# Patient Record
Sex: MALE | Race: WHITE | NOT HISPANIC OR LATINO | URBAN - METROPOLITAN AREA
[De-identification: names, ages, dates, MRNs, and addresses within clinical notes are randomized per-mention and may not be internally consistent; named-entity substitution may affect disease eponyms.]

---

## 2017-01-17 ENCOUNTER — COMMUNICATION - HEALTHEAST (OUTPATIENT)
Dept: FAMILY MEDICINE | Facility: CLINIC | Age: 67
End: 2017-01-17

## 2017-01-18 ENCOUNTER — COMMUNICATION - HEALTHEAST (OUTPATIENT)
Dept: FAMILY MEDICINE | Facility: CLINIC | Age: 67
End: 2017-01-18

## 2017-03-08 ENCOUNTER — COMMUNICATION - HEALTHEAST (OUTPATIENT)
Dept: FAMILY MEDICINE | Facility: CLINIC | Age: 67
End: 2017-03-08

## 2017-04-17 ENCOUNTER — OFFICE VISIT - HEALTHEAST (OUTPATIENT)
Dept: FAMILY MEDICINE | Facility: CLINIC | Age: 67
End: 2017-04-17

## 2017-04-17 DIAGNOSIS — A04.8 H. PYLORI INFECTION: ICD-10-CM

## 2017-04-17 DIAGNOSIS — I10 HYPERTENSION: ICD-10-CM

## 2017-04-17 DIAGNOSIS — R19.7 DIARRHEA: ICD-10-CM

## 2017-04-17 ASSESSMENT — MIFFLIN-ST. JEOR: SCORE: 2245.9

## 2017-08-14 ENCOUNTER — OFFICE VISIT - HEALTHEAST (OUTPATIENT)
Dept: FAMILY MEDICINE | Facility: CLINIC | Age: 67
End: 2017-08-14

## 2017-08-14 DIAGNOSIS — Z12.5 PROSTATE CANCER SCREENING: ICD-10-CM

## 2017-08-14 DIAGNOSIS — Z12.11 SCREEN FOR COLON CANCER: ICD-10-CM

## 2017-08-14 DIAGNOSIS — Z01.818 PREOPERATIVE EXAMINATION: ICD-10-CM

## 2017-08-14 DIAGNOSIS — I10 HYPERTENSION: ICD-10-CM

## 2017-08-14 DIAGNOSIS — H26.9 CATARACTS, BILATERAL: ICD-10-CM

## 2017-08-14 LAB
CHOLEST SERPL-MCNC: 256 MG/DL
FASTING STATUS PATIENT QL REPORTED: YES
HDLC SERPL-MCNC: 39 MG/DL
LDLC SERPL CALC-MCNC: 179 MG/DL
PSA SERPL-MCNC: 0.7 NG/ML (ref 0–4.5)
TRIGL SERPL-MCNC: 190 MG/DL

## 2017-08-14 ASSESSMENT — MIFFLIN-ST. JEOR: SCORE: 2236.83

## 2017-10-02 ENCOUNTER — AMBULATORY - HEALTHEAST (OUTPATIENT)
Dept: NURSING | Facility: CLINIC | Age: 67
End: 2017-10-02

## 2017-10-02 DIAGNOSIS — Z23 IMMUNIZATION DUE: ICD-10-CM

## 2017-10-11 ENCOUNTER — COMMUNICATION - HEALTHEAST (OUTPATIENT)
Dept: FAMILY MEDICINE | Facility: CLINIC | Age: 67
End: 2017-10-11

## 2017-10-11 DIAGNOSIS — I10 ESSENTIAL HYPERTENSION: ICD-10-CM

## 2017-11-22 ENCOUNTER — OFFICE VISIT - HEALTHEAST (OUTPATIENT)
Dept: FAMILY MEDICINE | Facility: CLINIC | Age: 67
End: 2017-11-22

## 2017-11-22 DIAGNOSIS — L03.011 PARONYCHIA OF FINGER OF RIGHT HAND: ICD-10-CM

## 2017-11-22 ASSESSMENT — MIFFLIN-ST. JEOR: SCORE: 2276.29

## 2018-07-13 ENCOUNTER — COMMUNICATION - HEALTHEAST (OUTPATIENT)
Dept: FAMILY MEDICINE | Facility: CLINIC | Age: 68
End: 2018-07-13

## 2018-07-13 DIAGNOSIS — I10 ESSENTIAL HYPERTENSION: ICD-10-CM

## 2018-10-12 ENCOUNTER — COMMUNICATION - HEALTHEAST (OUTPATIENT)
Dept: FAMILY MEDICINE | Facility: CLINIC | Age: 68
End: 2018-10-12

## 2018-10-12 DIAGNOSIS — I10 HTN (HYPERTENSION): ICD-10-CM

## 2019-01-04 ENCOUNTER — OFFICE VISIT - HEALTHEAST (OUTPATIENT)
Dept: FAMILY MEDICINE | Facility: CLINIC | Age: 69
End: 2019-01-04

## 2019-01-04 ENCOUNTER — COMMUNICATION - HEALTHEAST (OUTPATIENT)
Dept: FAMILY MEDICINE | Facility: CLINIC | Age: 69
End: 2019-01-04

## 2019-01-04 ENCOUNTER — HOSPITAL ENCOUNTER (OUTPATIENT)
Dept: LAB | Age: 69
Setting detail: SPECIMEN
Discharge: HOME | End: 2019-01-04

## 2019-01-04 DIAGNOSIS — R73.09 OTHER ABNORMAL GLUCOSE: ICD-10-CM

## 2019-01-04 DIAGNOSIS — I10 HTN (HYPERTENSION): ICD-10-CM

## 2019-01-04 DIAGNOSIS — Z12.5 ENCOUNTER FOR SCREENING FOR MALIGNANT NEOPLASM OF PROSTATE: ICD-10-CM

## 2019-01-04 DIAGNOSIS — E66.01 MORBID OBESITY (H): ICD-10-CM

## 2019-01-04 DIAGNOSIS — E78.49 OTHER HYPERLIPIDEMIA: ICD-10-CM

## 2019-01-04 DIAGNOSIS — Z00.00 MEDICARE ANNUAL WELLNESS VISIT, SUBSEQUENT: ICD-10-CM

## 2019-01-04 DIAGNOSIS — G47.33 OBSTRUCTIVE SLEEP APNEA (ADULT) (PEDIATRIC): ICD-10-CM

## 2019-01-04 DIAGNOSIS — R35.0 URINARY FREQUENCY: ICD-10-CM

## 2019-01-04 LAB
ALBUMIN SERPL-MCNC: 4.2 G/DL (ref 3.5–5)
ALBUMIN UR-MCNC: NEGATIVE MG/DL
ALP SERPL-CCNC: 63 U/L (ref 45–120)
ALT SERPL W P-5'-P-CCNC: 30 U/L (ref 0–45)
ANION GAP SERPL CALCULATED.3IONS-SCNC: 9 MMOL/L (ref 5–18)
APPEARANCE UR: CLEAR
AST SERPL W P-5'-P-CCNC: 21 U/L (ref 0–40)
BILIRUB DIRECT SERPL-MCNC: 0.2 MG/DL
BILIRUB SERPL-MCNC: 0.7 MG/DL (ref 0–1)
BILIRUB UR QL STRIP: NEGATIVE
BUN SERPL-MCNC: 18 MG/DL (ref 8–22)
CALCIUM SERPL-MCNC: 9.7 MG/DL (ref 8.5–10.5)
CHLORIDE BLD-SCNC: 102 MMOL/L (ref 98–107)
CHOLEST SERPL-MCNC: 280 MG/DL
CO2 SERPL-SCNC: 26 MMOL/L (ref 22–31)
COLOR UR AUTO: YELLOW
CREAT SERPL-MCNC: 0.96 MG/DL (ref 0.7–1.3)
ERYTHROCYTE [DISTWIDTH] IN BLOOD BY AUTOMATED COUNT: 11.8 % (ref 11–14.5)
FASTING STATUS PATIENT QL REPORTED: YES
GFR SERPL CREATININE-BSD FRML MDRD: >60 ML/MIN/1.73M2
GLUCOSE BLD-MCNC: 107 MG/DL (ref 70–125)
GLUCOSE UR STRIP-MCNC: NEGATIVE MG/DL
HCT VFR BLD AUTO: 45.1 % (ref 40–54)
HDLC SERPL-MCNC: 45 MG/DL
HGB BLD-MCNC: 15.5 G/DL (ref 14–18)
HGB UR QL STRIP: NEGATIVE
KETONES UR STRIP-MCNC: NEGATIVE MG/DL
LDLC SERPL CALC-MCNC: 200 MG/DL
LEUKOCYTE ESTERASE UR QL STRIP: NEGATIVE
MCH RBC QN AUTO: 31.3 PG (ref 27–34)
MCHC RBC AUTO-ENTMCNC: 34.3 G/DL (ref 32–36)
MCV RBC AUTO: 91 FL (ref 80–100)
NITRATE UR QL: NEGATIVE
PH UR STRIP: 5.5 [PH] (ref 5–8)
PLATELET # BLD AUTO: 184 THOU/UL (ref 140–440)
PMV BLD AUTO: 8.2 FL (ref 7–10)
POTASSIUM BLD-SCNC: 4.5 MMOL/L (ref 3.5–5)
PROT SERPL-MCNC: 7.4 G/DL (ref 6–8)
PSA SERPL-MCNC: 0.9 NG/ML (ref 0–4.5)
RBC # BLD AUTO: 4.93 MILL/UL (ref 4.4–6.2)
SODIUM SERPL-SCNC: 137 MMOL/L (ref 136–145)
SP GR UR STRIP: 1.02 (ref 1–1.03)
TRIGL SERPL-MCNC: 173 MG/DL
UROBILINOGEN UR STRIP-ACNC: NORMAL
WBC: 6.3 THOU/UL (ref 4–11)

## 2019-01-04 ASSESSMENT — MIFFLIN-ST. JEOR: SCORE: 2263.36

## 2019-01-23 ENCOUNTER — COMMUNICATION - HEALTHEAST (OUTPATIENT)
Dept: FAMILY MEDICINE | Facility: CLINIC | Age: 69
End: 2019-01-23

## 2019-01-29 ENCOUNTER — RECORDS - HEALTHEAST (OUTPATIENT)
Dept: ADMINISTRATIVE | Facility: OTHER | Age: 69
End: 2019-01-29

## 2019-01-29 LAB — COLOGUARD-ABSTRACT: NEGATIVE

## 2019-02-14 ENCOUNTER — COMMUNICATION - HEALTHEAST (OUTPATIENT)
Dept: FAMILY MEDICINE | Facility: CLINIC | Age: 69
End: 2019-02-14

## 2019-02-15 ENCOUNTER — COMMUNICATION - HEALTHEAST (OUTPATIENT)
Dept: FAMILY MEDICINE | Facility: CLINIC | Age: 69
End: 2019-02-15

## 2019-04-04 ENCOUNTER — COMMUNICATION - HEALTHEAST (OUTPATIENT)
Dept: FAMILY MEDICINE | Facility: CLINIC | Age: 69
End: 2019-04-04

## 2019-04-04 DIAGNOSIS — E78.49 OTHER HYPERLIPIDEMIA: ICD-10-CM

## 2019-06-19 ENCOUNTER — RECORDS - HEALTHEAST (OUTPATIENT)
Dept: HEALTH INFORMATION MANAGEMENT | Facility: CLINIC | Age: 69
End: 2019-06-19

## 2019-07-01 ENCOUNTER — HOSPITAL ENCOUNTER (OUTPATIENT)
Dept: LAB | Age: 69
Setting detail: SPECIMEN
Discharge: HOME | End: 2019-07-01

## 2019-07-01 ENCOUNTER — AMBULATORY - HEALTHEAST (OUTPATIENT)
Dept: LAB | Facility: CLINIC | Age: 69
End: 2019-07-01

## 2019-07-01 DIAGNOSIS — E78.49 OTHER HYPERLIPIDEMIA: ICD-10-CM

## 2019-07-01 LAB
ALBUMIN SERPL-MCNC: 3.9 G/DL (ref 3.5–5)
ALP SERPL-CCNC: 69 U/L (ref 45–120)
ALT SERPL W P-5'-P-CCNC: 39 U/L (ref 0–45)
AST SERPL W P-5'-P-CCNC: 17 U/L (ref 0–40)
BILIRUB DIRECT SERPL-MCNC: 0.2 MG/DL
BILIRUB SERPL-MCNC: 0.6 MG/DL (ref 0–1)
CHOLEST SERPL-MCNC: 161 MG/DL
FASTING STATUS PATIENT QL REPORTED: YES
FASTING STATUS PATIENT QL REPORTED: YES
GLUCOSE BLD-MCNC: 112 MG/DL (ref 70–99)
HDLC SERPL-MCNC: 44 MG/DL
LDLC SERPL CALC-MCNC: 91 MG/DL
PROT SERPL-MCNC: 7 G/DL (ref 6–8)
TRIGL SERPL-MCNC: 129 MG/DL

## 2019-07-02 ENCOUNTER — COMMUNICATION - HEALTHEAST (OUTPATIENT)
Dept: FAMILY MEDICINE | Facility: CLINIC | Age: 69
End: 2019-07-02

## 2019-07-02 DIAGNOSIS — E78.49 OTHER HYPERLIPIDEMIA: ICD-10-CM

## 2019-10-10 ENCOUNTER — AMBULATORY - HEALTHEAST (OUTPATIENT)
Dept: NURSING | Facility: CLINIC | Age: 69
End: 2019-10-10

## 2019-10-10 DIAGNOSIS — Z23 FLU VACCINE NEED: ICD-10-CM

## 2019-12-21 ENCOUNTER — COMMUNICATION - HEALTHEAST (OUTPATIENT)
Dept: FAMILY MEDICINE | Facility: CLINIC | Age: 69
End: 2019-12-21

## 2019-12-21 DIAGNOSIS — I10 HTN (HYPERTENSION): ICD-10-CM

## 2019-12-28 ENCOUNTER — COMMUNICATION - HEALTHEAST (OUTPATIENT)
Dept: FAMILY MEDICINE | Facility: CLINIC | Age: 69
End: 2019-12-28

## 2019-12-28 DIAGNOSIS — E78.49 OTHER HYPERLIPIDEMIA: ICD-10-CM

## 2020-03-21 ENCOUNTER — COMMUNICATION - HEALTHEAST (OUTPATIENT)
Dept: FAMILY MEDICINE | Facility: CLINIC | Age: 70
End: 2020-03-21

## 2020-03-21 DIAGNOSIS — I10 HTN (HYPERTENSION): ICD-10-CM

## 2020-03-21 DIAGNOSIS — E78.49 OTHER HYPERLIPIDEMIA: ICD-10-CM

## 2020-03-23 ENCOUNTER — COMMUNICATION - HEALTHEAST (OUTPATIENT)
Dept: FAMILY MEDICINE | Facility: CLINIC | Age: 70
End: 2020-03-23

## 2020-03-23 DIAGNOSIS — I10 HTN (HYPERTENSION): ICD-10-CM

## 2020-04-09 ENCOUNTER — COMMUNICATION - HEALTHEAST (OUTPATIENT)
Dept: FAMILY MEDICINE | Facility: CLINIC | Age: 70
End: 2020-04-09

## 2020-04-09 ENCOUNTER — RECORDS - HEALTHEAST (OUTPATIENT)
Dept: ADMINISTRATIVE | Facility: OTHER | Age: 70
End: 2020-04-09

## 2020-09-16 ENCOUNTER — COMMUNICATION - HEALTHEAST (OUTPATIENT)
Dept: FAMILY MEDICINE | Facility: CLINIC | Age: 70
End: 2020-09-16

## 2020-09-16 DIAGNOSIS — I10 HTN (HYPERTENSION): ICD-10-CM

## 2020-09-24 ENCOUNTER — COMMUNICATION - HEALTHEAST (OUTPATIENT)
Dept: FAMILY MEDICINE | Facility: CLINIC | Age: 70
End: 2020-09-24

## 2020-09-24 ENCOUNTER — RECORDS - HEALTHEAST (OUTPATIENT)
Dept: ADMINISTRATIVE | Facility: OTHER | Age: 70
End: 2020-09-24

## 2020-09-24 DIAGNOSIS — I10 HTN (HYPERTENSION): ICD-10-CM

## 2020-10-07 ENCOUNTER — COMMUNICATION - HEALTHEAST (OUTPATIENT)
Dept: FAMILY MEDICINE | Facility: CLINIC | Age: 70
End: 2020-10-07

## 2020-10-07 DIAGNOSIS — I10 HTN (HYPERTENSION): ICD-10-CM

## 2020-11-09 ENCOUNTER — COMMUNICATION - HEALTHEAST (OUTPATIENT)
Dept: FAMILY MEDICINE | Facility: CLINIC | Age: 70
End: 2020-11-09

## 2020-11-09 DIAGNOSIS — Z12.5 SCREENING FOR PROSTATE CANCER: ICD-10-CM

## 2020-11-09 DIAGNOSIS — I10 ESSENTIAL HYPERTENSION: ICD-10-CM

## 2020-11-09 DIAGNOSIS — E78.49 OTHER HYPERLIPIDEMIA: ICD-10-CM

## 2020-11-18 ENCOUNTER — OFFICE VISIT - HEALTHEAST (OUTPATIENT)
Dept: FAMILY MEDICINE | Facility: CLINIC | Age: 70
End: 2020-11-18

## 2020-11-18 DIAGNOSIS — Z00.00 MEDICARE ANNUAL WELLNESS VISIT, SUBSEQUENT: ICD-10-CM

## 2020-11-18 DIAGNOSIS — E66.01 MORBID OBESITY (H): ICD-10-CM

## 2020-11-18 DIAGNOSIS — G47.33 OBSTRUCTIVE SLEEP APNEA (ADULT) (PEDIATRIC): ICD-10-CM

## 2020-11-18 DIAGNOSIS — R39.11 BENIGN PROSTATIC HYPERPLASIA WITH URINARY HESITANCY: ICD-10-CM

## 2020-11-18 DIAGNOSIS — Z12.5 ENCOUNTER FOR SCREENING FOR MALIGNANT NEOPLASM OF PROSTATE: ICD-10-CM

## 2020-11-18 DIAGNOSIS — N40.1 BENIGN PROSTATIC HYPERPLASIA WITH URINARY HESITANCY: ICD-10-CM

## 2020-11-18 DIAGNOSIS — E78.49 OTHER HYPERLIPIDEMIA: ICD-10-CM

## 2020-11-18 LAB
ALBUMIN SERPL-MCNC: 4.5 G/DL (ref 3.5–5)
ALP SERPL-CCNC: 66 U/L (ref 45–120)
ALT SERPL W P-5'-P-CCNC: 39 U/L (ref 0–45)
ANION GAP SERPL CALCULATED.3IONS-SCNC: 10 MMOL/L (ref 5–18)
AST SERPL W P-5'-P-CCNC: 22 U/L (ref 0–40)
BILIRUB DIRECT SERPL-MCNC: 0.2 MG/DL
BILIRUB SERPL-MCNC: 0.7 MG/DL (ref 0–1)
BUN SERPL-MCNC: 13 MG/DL (ref 8–22)
CALCIUM SERPL-MCNC: 9.8 MG/DL (ref 8.5–10.5)
CHLORIDE BLD-SCNC: 105 MMOL/L (ref 98–107)
CO2 SERPL-SCNC: 23 MMOL/L (ref 22–31)
CREAT SERPL-MCNC: 0.88 MG/DL (ref 0.7–1.3)
ERYTHROCYTE [DISTWIDTH] IN BLOOD BY AUTOMATED COUNT: 11.8 % (ref 11–14.5)
GFR SERPL CREATININE-BSD FRML MDRD: >60 ML/MIN/1.73M2
GLUCOSE BLD-MCNC: 101 MG/DL (ref 70–125)
HCT VFR BLD AUTO: 44 % (ref 40–54)
HGB BLD-MCNC: 15 G/DL (ref 14–18)
MCH RBC QN AUTO: 31.6 PG (ref 27–34)
MCHC RBC AUTO-ENTMCNC: 34.1 G/DL (ref 32–36)
MCV RBC AUTO: 93 FL (ref 80–100)
PLATELET # BLD AUTO: 183 THOU/UL (ref 140–440)
PMV BLD AUTO: 8.7 FL (ref 7–10)
POTASSIUM BLD-SCNC: 4.5 MMOL/L (ref 3.5–5)
PROT SERPL-MCNC: 7.1 G/DL (ref 6–8)
PSA SERPL-MCNC: 1.1 NG/ML (ref 0–4.5)
RBC # BLD AUTO: 4.75 MILL/UL (ref 4.4–6.2)
SODIUM SERPL-SCNC: 138 MMOL/L (ref 136–145)
WBC: 7.7 THOU/UL (ref 4–11)

## 2020-11-18 ASSESSMENT — MIFFLIN-ST. JEOR: SCORE: 2216.19

## 2020-11-19 LAB
CHOLEST SERPL-MCNC: 149 MG/DL
FASTING STATUS PATIENT QL REPORTED: YES
HDLC SERPL-MCNC: 40 MG/DL
LDLC SERPL CALC-MCNC: 87 MG/DL
TRIGL SERPL-MCNC: 108 MG/DL

## 2020-12-30 ENCOUNTER — COMMUNICATION - HEALTHEAST (OUTPATIENT)
Dept: FAMILY MEDICINE | Facility: CLINIC | Age: 70
End: 2020-12-30

## 2020-12-30 DIAGNOSIS — I10 HTN (HYPERTENSION): ICD-10-CM

## 2021-01-01 RX ORDER — LISINOPRIL 40 MG/1
40 TABLET ORAL DAILY
Qty: 90 TABLET | Refills: 2 | Status: SHIPPED | OUTPATIENT
Start: 2021-01-01

## 2021-02-17 ENCOUNTER — RECORDS - HEALTHEAST (OUTPATIENT)
Dept: ADMINISTRATIVE | Facility: OTHER | Age: 71
End: 2021-02-17

## 2021-03-18 ENCOUNTER — COMMUNICATION - HEALTHEAST (OUTPATIENT)
Dept: FAMILY MEDICINE | Facility: CLINIC | Age: 71
End: 2021-03-18

## 2021-03-18 DIAGNOSIS — E78.49 OTHER HYPERLIPIDEMIA: ICD-10-CM

## 2021-03-19 RX ORDER — ATORVASTATIN CALCIUM 40 MG/1
TABLET, FILM COATED ORAL
Qty: 90 TABLET | Refills: 2 | Status: SHIPPED | OUTPATIENT
Start: 2021-03-19

## 2021-05-27 NOTE — TELEPHONE ENCOUNTER
Dr. Smith-  See pt's [employer] message and reply via [employer].  Pt is agreeable to starting a statin now that he has returned.

## 2021-05-30 VITALS — BODY MASS INDEX: 39.78 KG/M2 | HEIGHT: 74 IN | WEIGHT: 310 LBS

## 2021-05-30 NOTE — TELEPHONE ENCOUNTER
Refill Approved    Rx renewed per Medication Renewal Policy. Medication was last renewed on 4/5/19.    Mei Hammond, [facility] Triage/Med Refill 7/2/2019     Requested Prescriptions   Pending Prescriptions Disp Refills     atorvastatin (LIPITOR) 40 MG tablet [Pharmacy Med Name: ATORVASTATIN 40MG TABLETS] 90 tablet 0     Sig: TAKE 1 TABLET(40 MG) BY MOUTH DAILY       Statins Refill Protocol (Hmg CoA Reductase Inhibitors) Passed - 7/2/2019  3:26 AM        Passed - PCP or prescribing provider visit in past 12 months      Last office visit with prescriber/PCP: 4/17/2017 Fernando Smith MD OR same dept: Visit date not found OR same specialty: 11/22/2017 Victor Manuel Wynne MD  Last physical: 1/4/2019 Last MTM visit: Visit date not found   Next visit within 3 mo: Visit date not found  Next physical within 3 mo: Visit date not found  Prescriber OR PCP: Fernando Smith MD  Last diagnosis associated with med order: 1. Other hyperlipidemia  - atorvastatin (LIPITOR) 40 MG tablet [Pharmacy Med Name: ATORVASTATIN 40MG TABLETS]; TAKE 1 TABLET(40 MG) BY MOUTH DAILY  Dispense: 90 tablet; Refill: 0    If protocol passes may refill for 12 months if within 3 months of last provider visit (or a total of 15 months).

## 2021-05-31 VITALS — HEIGHT: 74 IN | WEIGHT: 308 LBS | BODY MASS INDEX: 39.53 KG/M2

## 2021-05-31 VITALS — WEIGHT: 315 LBS | BODY MASS INDEX: 40.43 KG/M2 | HEIGHT: 74 IN

## 2021-06-02 VITALS — HEIGHT: 74 IN | BODY MASS INDEX: 40.43 KG/M2 | WEIGHT: 315 LBS

## 2021-06-04 NOTE — TELEPHONE ENCOUNTER
Refill Approved    Rx renewed per Medication Renewal Policy. Medication was last renewed on 1/4/19.    Lori Medina, [facility] Connection Triage/Med Refill 12/24/2019     Requested Prescriptions   Pending Prescriptions Disp Refills     lisinopril (PRINIVIL,ZESTRIL) 40 MG tablet [Pharmacy Med Name: LISINOPRIL 40MG TABLETS] 90 tablet 3     Sig: TAKE 1 TABLET BY MOUTH DAILY.       Ace Inhibitors Refill Protocol Passed - 12/21/2019  5:11 AM        Passed - PCP or prescribing provider visit in past 12 months       Last office visit with prescriber/PCP: 4/17/2017 Fernando Smith MD OR same dept: Visit date not found OR same specialty: 11/22/2017 Victor Manuel Wynne MD  Last physical: 1/4/2019 Last MTM visit: Visit date not found   Next visit within 3 mo: Visit date not found  Next physical within 3 mo: Visit date not found  Prescriber OR PCP: Fernando Smith MD  Last diagnosis associated with med order: 1. HTN (hypertension)  - lisinopril (PRINIVIL,ZESTRIL) 40 MG tablet [Pharmacy Med Name: LISINOPRIL 40MG TABLETS]; TAKE 1 TABLET BY MOUTH DAILY.  Dispense: 90 tablet; Refill: 3    If protocol passes may refill for 12 months if within 3 months of last provider visit (or a total of 15 months).             Passed - Serum Potassium in past 12 months     Lab Results   Component Value Date    Potassium 4.5 01/04/2019             Passed - Blood pressure filed in past 12 months     BP Readings from Last 1 Encounters:   01/04/19 124/72             Passed - Serum Creatinine in past 12 months     Creatinine   Date Value Ref Range Status   01/04/2019 0.96 0.70 - 1.30 mg/dL Final

## 2021-06-04 NOTE — TELEPHONE ENCOUNTER
Refill Approved    Rx renewed per Medication Renewal Policy. Medication was last renewed on 7/2/19.    Mei Hammond, [facility] Triage/Med Refill 12/29/2019     Requested Prescriptions   Pending Prescriptions Disp Refills     atorvastatin (LIPITOR) 40 MG tablet [Pharmacy Med Name: ATORVASTATIN 40MG TABLETS] 90 tablet 2     Sig: TAKE 1 TABLET(40 MG) BY MOUTH DAILY       Statins Refill Protocol (Hmg CoA Reductase Inhibitors) Passed - 12/28/2019  3:26 AM        Passed - PCP or prescribing provider visit in past 12 months      Last office visit with prescriber/PCP: 4/17/2017 Fernando Smith MD OR same dept: Visit date not found OR same specialty: 11/22/2017 Victor Manuel Wynne MD  Last physical: 1/4/2019 Last MTM visit: Visit date not found   Next visit within 3 mo: Visit date not found  Next physical within 3 mo: Visit date not found  Prescriber OR PCP: Fernando Smith MD  Last diagnosis associated with med order: 1. Other hyperlipidemia  - atorvastatin (LIPITOR) 40 MG tablet [Pharmacy Med Name: ATORVASTATIN 40MG TABLETS]; TAKE 1 TABLET(40 MG) BY MOUTH DAILY  Dispense: 90 tablet; Refill: 2    If protocol passes may refill for 12 months if within 3 months of last provider visit (or a total of 15 months).

## 2021-06-05 VITALS
WEIGHT: 305.2 LBS | HEIGHT: 74 IN | OXYGEN SATURATION: 95 % | SYSTOLIC BLOOD PRESSURE: 135 MMHG | HEART RATE: 83 BPM | DIASTOLIC BLOOD PRESSURE: 73 MMHG | BODY MASS INDEX: 39.17 KG/M2

## 2021-06-07 NOTE — TELEPHONE ENCOUNTER
Dr. Calixarity-  See pt's [employer] message and reply via [employer].  Rx's queued for MD approval.

## 2021-06-10 NOTE — PROGRESS NOTES
SUBJECTIVE:    This is a pleasant 66-year-old male who presents to the clinic as he recently was diagnosed with an H. pylori infection.  He states that he and his family were traveling to [place] and rented a home.  During the course of his stay he developed an illness characterized by severe diarrhea.  Over the course of 3 days he had diarrhea characterized by watery stools.  He developed diffuse abdominal pain as well and was seen in urgent care clinic and was diagnosed with H. pylori.  As result, he was treated with dicyclomine, amoxicillin, omeprazole, and clarithromycin.  He did take the full regimen.  His abdominal pain has resolved.  His diarrhea has also resolved.  He states that his pain was quite sharp but severe when he had this.  He would like to get rechecked for H. pylori.  He denies dark and tarry stools or passage of bright red blood per rectum.  He is due for a colonoscopy.  Medical history is otherwise notable for hypertension as well as obstructive sleep apnea.  He takes lisinopril as blood pressure has been well controlled.  He did have a colonoscopy in August 2004 that was normal.  He denies recent chest pain or palpitations.  His travels were quite stressful.  Unfortunately, his mother-in-law fell down some stairs and passed away 2 days later.  He denies nausea or vomiting.      Patient Active Problem List   Diagnosis     Obstructive Sleep Apnea     Obesity     Hyperlipidemia     Male Erectile Disorder     Benign Prostatic Hypertrophy     Osteoarthritis Of The Knee     Prediabetes     Blood Pressure Isolated Elevated     Acrochordon     Joint Pain, Localized In The Knee     Total knee replacement status     Sleep apnea     BPH (benign prostatic hyperplasia)     Acute blood loss anemia     Hypertension     History of total knee arthroplasty, left     Essential hypertension     LEONARDA (obstructive sleep apnea)     Obesity (BMI 30.0-34.9)       Current Outpatient Prescriptions on File Prior to Visit    Medication Sig     lisinopril (PRINIVIL,ZESTRIL) 40 MG tablet Take 40 mg by mouth daily.     [DISCONTINUED] codeine-guaiFENesin (GUAIFENESIN AC)  mg/5 mL liquid Take 10 mL by mouth 4 (four) times a day as needed for cough.     [DISCONTINUED] polyethylene glycol (MIRALAX) 17 gram packet Take 17 g by mouth daily. Indications: Constipation     [DISCONTINUED] senna-docusate (SENNOSIDES-DOCUSATE SODIUM) 8.6-50 mg tablet Take 1-2 tablets by mouth daily.     [DISCONTINUED] tadalafil (CIALIS) 20 MG tablet Take 20 mg by mouth daily as needed for erectile dysfunction.     [DISCONTINUED] warfarin (COUMADIN) 2 MG tablet Take 1 to 5 tablets (2 to 10 mg) by mouth daily. Adjust dose based on INR results as directed. (Patient taking differently: Order: Coumadin 4mg po on 10/20 and 10/21, 6mg on 10/22, 4mg po on 10/23  Indications: Deep Vein Thrombosis Prevention)     No current facility-administered medications on file prior to visit.        No Known Allergies         A 12 point comprehensive review of systems was negative except as noted.      OBJECTIVE:     Vitals:    04/17/17 0832   BP: 128/72   Pulse: 88   Resp: 20   Temp: 98.2  F (36.8  C)       General: Alert, not acutely distressed   Head:  Atraumatic, normocephalic  Eyes:  PERRL, extraocular movements are intact  Neck:  Supple without adenopathy or thyromegaly   Cardiovascular: S1-S2 with regular rate and without murmur, rub, or gallop   Lungs:  Clear to auscultation bilaterally   Abdomen: Soft, obese, and nontender  Extremities: Without cyanosis or edema   Neuro:  CN II-XII appear intact        Laboratory Results:    Results for orders placed or performed during the hospital encounter of 10/13/16   INR (Baseline for all patients undergoing hip or knee procedures)   Result Value Ref Range    INR 0.96 0.90 - 1.10   Hemoglobin - Daily x 2   Result Value Ref Range    Hemoglobin 12.7 (L) 14.0 - 18.0 g/dL   Potassium - Tomorrow AM   Result Value Ref Range    Potassium  4.3 3.5 - 5.0 mmol/L   INR   Result Value Ref Range    INR 1.10 0.90 - 1.10   Hemoglobin - Daily x 2   Result Value Ref Range    Hemoglobin 13.3 (L) 14.0 - 18.0 g/dL   INR   Result Value Ref Range    INR 1.16 (H) 0.90 - 1.10         ASSESSMENT AND PLAN:    1. Hypertension, well-controlled    Continue lisinopril 40 mg daily  Monitor blood pressure  Check a basic metabolic panel at the next visit    2. H. pylori infection    He was treated with dicyclomine, amoxicillin, omeprazole and clarithromycin  - H. pylori Antigen, Stool  Check for H. pylori stool antigen  Consider ongoing treatment if necessary    3. Diarrhea  Likely infectious colitis    His symptoms have resolved  Reviewed colon cancer screening.  He had a colonoscopy in 2004 and is due.  He will do this in the fall.        Fernando Smith MD      This note has been dictated and transcribed using voice recognition software.  Any grammatical or contextual distortions are unintentional and inherent to the software.

## 2021-06-11 NOTE — TELEPHONE ENCOUNTER
Left message for pt to call back.  Dr. Smith would like to move his November appt up to October instead.  Ok to use a hold spot on Dr. Smith's schedule in October.

## 2021-06-11 NOTE — TELEPHONE ENCOUNTER
Please call.  I refilled his medication but he has not been seen in a year and a half.  He needs a visit with me.  Okay for virtual visit if there are concerns but he will need laboratory testing as well.

## 2021-06-11 NOTE — TELEPHONE ENCOUNTER
RN cannot approve Refill Request    RN can NOT refill this medication Protocol failed and NO refill given. Last office visit: 4/17/2017 Fernando Smith MD Last Physical: 1/4/2019 Last MTM visit: Visit date not found Last visit same specialty: 11/22/2017 Victor Manuel Wynne MD.  Next visit within 3 mo: Visit date not found  Next physical within 3 mo: Visit date not found      Mei Hammond, [facility] Connection Triage/Med Refill 9/28/2020    Requested Prescriptions   Pending Prescriptions Disp Refills     lisinopriL (PRINIVIL,ZESTRIL) 40 MG tablet 90 tablet 1     Sig: Take 1 tablet (40 mg total) by mouth daily.       Ace Inhibitors Refill Protocol Failed - 9/24/2020  4:23 PM        Failed - PCP or prescribing provider visit in past 12 months       Last office visit with prescriber/PCP: 4/17/2017 Fernando Smith MD OR same dept: Visit date not found OR same specialty: 11/22/2017 Victor Manuel Wynne MD  Last physical: 1/4/2019 Last MTM visit: Visit date not found   Next visit within 3 mo: Visit date not found  Next physical within 3 mo: Visit date not found  Prescriber OR PCP: Fernando Smith MD  Last diagnosis associated with med order: 1. HTN (hypertension)  - lisinopriL (PRINIVIL,ZESTRIL) 40 MG tablet; Take 1 tablet (40 mg total) by mouth daily.  Dispense: 90 tablet; Refill: 1    If protocol passes may refill for 12 months if within 3 months of last provider visit (or a total of 15 months).             Failed - Serum Potassium in past 12 months     No results found for: LN-POTASSIUM          Failed - Blood pressure filed in past 12 months     BP Readings from Last 1 Encounters:   01/04/19 124/72             Failed - Serum Creatinine in past 12 months     Creatinine   Date Value Ref Range Status   01/04/2019 0.96 0.70 - 1.30 mg/dL Final

## 2021-06-11 NOTE — TELEPHONE ENCOUNTER
RN cannot approve Refill Request    RN can NOT refill this medication Protocol failed and NO refill given. Last office visit: 4/17/2017 Fernando Smith MD Last Physical: 1/4/2019 Last MTM visit: Visit date not found Last visit same specialty: 11/22/2017 Victor Manuel Wynne MD.  Next visit within 3 mo: Visit date not found  Next physical within 3 mo: Visit date not found      Mei Hammond, [facility] Connection Triage/Med Refill 9/17/2020    Requested Prescriptions   Pending Prescriptions Disp Refills     lisinopriL (PRINIVIL,ZESTRIL) 40 MG tablet [Pharmacy Med Name: LISINOPRIL 40MG TABLETS] 90 tablet 1     Sig: TAKE 1 TABLET(40 MG) BY MOUTH DAILY       Ace Inhibitors Refill Protocol Failed - 9/16/2020  5:47 AM        Failed - PCP or prescribing provider visit in past 12 months       Last office visit with prescriber/PCP: 4/17/2017 Fernando Smith MD OR same dept: Visit date not found OR same specialty: 11/22/2017 Victor Manuel Wynne MD  Last physical: 1/4/2019 Last MTM visit: Visit date not found   Next visit within 3 mo: Visit date not found  Next physical within 3 mo: Visit date not found  Prescriber OR PCP: Fernando Smith MD  Last diagnosis associated with med order: 1. HTN (hypertension)  - lisinopriL (PRINIVIL,ZESTRIL) 40 MG tablet [Pharmacy Med Name: LISINOPRIL 40MG TABLETS]; TAKE 1 TABLET(40 MG) BY MOUTH DAILY  Dispense: 90 tablet; Refill: 1    If protocol passes may refill for 12 months if within 3 months of last provider visit (or a total of 15 months).             Failed - Serum Potassium in past 12 months     No results found for: LN-POTASSIUM          Failed - Blood pressure filed in past 12 months     BP Readings from Last 1 Encounters:   01/04/19 124/72             Failed - Serum Creatinine in past 12 months     Creatinine   Date Value Ref Range Status   01/04/2019 0.96 0.70 - 1.30 mg/dL Final

## 2021-06-11 NOTE — TELEPHONE ENCOUNTER
Left message for pt to call back to schedule an appt.  Ok to schedule with Dr. Smith in a hold spot in October.

## 2021-06-12 NOTE — PROGRESS NOTES
Assessment/Plan:      Visit for Preoperative Exam.    Bilateral cataracts  Hypertension, currently stable      Patient approved for surgery with general or local anesthesia.   Follow-up as recommended by ophthalmology  For hypertension we will check a basic metabolic panel and a lipid cascade  Check a PSA for prostate cancer screening  Recommend a colonoscopy    Subjective:     Scheduled Procedure: Cataract  Surgery Date:  8/23/17  Surgery Location[facility]  [facility] - fax [phone]  Surgeon:  Dr. Torrez    This is a pleasant 67-year-old male who presents to the clinic for a preoperative evaluation.  He has a history of bilateral cataracts.  He has had blurred vision in both eyes.  Nighttime vision can be poor.  As result he will have surgery for bilateral cataracts.  His medical history is notable for hypertension which has been stable.  He has been taking lisinopril that has been effective.  In general, he feels very well.  He is active in playing golf at least twice a week.  Review of systems negative for headache, dizziness, chest pain, palpitations, or other bowel concerns.  In the remote past he had H pylori which was treated.  He is willing to consider a colonoscopy.  His father-in-law is currently living with him.    Current Outpatient Prescriptions   Medication Sig Dispense Refill     lisinopril (PRINIVIL,ZESTRIL) 40 MG tablet Take 40 mg by mouth daily.       No current facility-administered medications for this visit.        No Known Allergies    Immunization History   Administered Date(s) Administered     Influenza Whole 10/03/2014     Influenza, seasonal,quad inj 36+ mos 10/05/2015, 10/03/2016     Influenza, seasonal,quad inj 6-35 mos 09/15/2011, 10/17/2013, 10/03/2014     Tdap 09/15/2011     ZOSTER 10/17/2013       Patient Active Problem List   Diagnosis     Obstructive Sleep Apnea     Obesity     Hyperlipidemia     Male Erectile Disorder     Benign Prostatic Hypertrophy     Osteoarthritis Of The Knee      Prediabetes     Blood Pressure Isolated Elevated     Acrochordon     Joint Pain, Localized In The Knee     Total knee replacement status     Sleep apnea     BPH (benign prostatic hyperplasia)     Acute blood loss anemia     Hypertension     History of total knee arthroplasty, left     Essential hypertension     LEONARDA (obstructive sleep apnea)     Obesity (BMI 30.0-34.9)       Past Medical History:   Diagnosis Date     Anemia      BPH (benign prostatic hyperplasia)      Degenerative joint disease      Hypercholesteremia      Hypertension      Knee pain      Prediabetes      Sleep apnea        Social History     Social History     Marital status:      Spouse name: N/A     Number of children: N/A     Years of education: N/A     Occupational History     Not on file.     Social History Main Topics     Smoking status: Never Smoker     Smokeless tobacco: Not on file     Alcohol use 1.2 oz/week     2 Shots of liquor per week     Drug use: No     Sexual activity: Not on file     Other Topics Concern     Not on file     Social History Narrative       Past Surgical History:   Procedure Laterality Date     JOINT REPLACEMENT Right      MA REMOVAL OF TONSILS,<13 Y/O      Description: Tonsillectomy;  Recorded: 09/15/2011;     MA TOTAL KNEE ARTHROPLASTY Right 10/23/2014    Procedure: KNEE TOTAL ARTHROPLASTY;  Surgeon: Kash Bhatt MD;  Location: [facility];  Service: Orthopedics     MA TOTAL KNEE ARTHROPLASTY Left 10/13/2016    Procedure: LEFT TOTAL KNEE  ARTHROPLASTY;  Surgeon: Kash Bhatt MD;  Location: [facility];  Service: Orthopedics     TONSILLECTOMY         History of Present Illness  Recent Health  Fever: no  Chills: no  Fatigue: yes  Chest Pain: no  Cough: no  Dyspnea: no  Urinary Frequency: no  Nausea: no  Vomiting: no  Diarrhea: no  Abdominal Pain: no  Easy Bruising: no  Lower Extremity Swelling: no  Poor Exercise Tolerance: no        Pertinent History  Prior Anesthesia: yes  Previous  "Anesthesia Reaction:  no  Diabetes: no  Cardiovascular Disease: no  Pulmonary Disease: no  Renal Disease: no  GI Disease: no  Sleep Apnea: no  Thromboembolic Problems: no  Clotting Disorder: no  Bleeding Disorder: no  Transfusion Reaction: no  Impaired Immunity: no  Steroid use in the last 6 months: no  Frequent Aspirin use: no    Family history of no anesthesia reactions    Social history of there are no concerns regarding care after surgery    After surgery, the patient plans to recover at home alone.    Review of Systems  A 12 point comprehensive review of systems was negative except as noted.          Objective:         Vitals:    08/14/17 0752   BP: 128/70   Pulse: 64   Resp: 20   Temp: 97.8  F (36.6  C)   TempSrc: Oral   Weight: (!) 308 lb (139.7 kg)   Height: 6' 2\" (1.88 m)       Physical Exam:    General Appearance: Alert, cooperative, no distress, appears stated age  Head: Normocephalic, without obvious abnormality, atraumatic  Eyes: PERRL, conjunctiva/corneas clear, EOM's intact  Ears: Normal TM's and external ear canals, both ears  Nose: Nares normal, septum midline,mucosa normal, no drainage  Throat: Lips, mucosa, and tongue normal; teeth and gums normal  Neck: Supple, symmetrical, trachea midline, no adenopathy  Lungs: Clear to auscultation bilaterally, respirations unlabored  Heart: Regular rate and rhythm, S1 and S2 normal, no murmur, rub, or gallop,  Abdomen: Soft, non-tender, bowel sounds active all four quadrants,  no masses, no organomegaly  Musculoskeletal: Normal range of motion. No joint swelling or deformity.   Extremities: Extremities normal, atraumatic, no cyanosis or edema  Skin: Skin color, texture, turgor normal, no rashes or lesions  Neurologic: He is alert. He has normal reflexes.   Psychiatric: He has a normal mood and affect.      Addendum: He can consider the Prevnar vaccine when he gets his influenza vaccine  He will consider a daily baby aspirin  "

## 2021-06-13 NOTE — PROGRESS NOTES
Assessment and Plan:     Patient has been advised of split billing requirements and indicates understanding: Yes, card given  1. Medicare annual wellness visit, subsequent    Reviewed the annual wellness visit health risk assessment form  Mini cog score is 5/5  PHQ-2 score is 0  Reviewed falls risk    His Cologuard test was negative in January of 2019.  He is due in January of 2022    Patient declines hepatitis C testing  Recommend that he consider the Shingrix/shingles vaccine      2. Obstructive Sleep Apnea    Continue CPAP    3. Other hyperlipidemia    Continue atorvastatin  Check laboratory testing as noted  - Basic Metabolic Panel  - Hepatic Profile  - Lipid Cascade  - HM2(CBC w/o Differential)    4. Benign prostatic hyperplasia with urinary hesitancy    Recommend monitoring for obstructive symptoms  Consider referral to urology  - PSA, Annual Screen (Prostatic-Specific Antigen)    5. Encounter for screening for malignant neoplasm of prostate     Check a PSA  - PSA, Annual Screen (Prostatic-Specific Antigen)    6. Morbid obesity (H)    Recommend working on diet and nutrition for weight loss  I reviewed comorbidities     7.  Right hand nocturnal numbness    Symptoms appear to be occurring at night and may be positional  He denies neck pain.  Can consider imaging of the cervical spine  There is no evidence of thoracic outlet syndrome on examination  He has normal  strength  Consider further evaluation if having ongoing symptoms  Patient will provide an update and follow-up as needed        The patient's current medical problems were reviewed.    I have had an Advance Directives discussion with the patient.  The following health maintenance schedule was reviewed with the patient and provided in printed form in the after visit summary:   Health Maintenance   Topic Date Due     HEPATITIS C SCREENING  1950     ZOSTER VACCINES (2 of 3) 12/12/2013     TD 18+ HE  09/15/2021     MEDICARE ANNUAL WELLNESS VISIT   11/18/2021     FALL RISK ASSESSMENT  11/18/2021     COLORECTAL CANCER SCREENING  01/31/2022     ADVANCE CARE PLANNING  01/04/2024     LIPID  07/01/2024     Pneumococcal Vaccine: 65+ Years  Completed     INFLUENZA VACCINE RULE BASED  Completed     Pneumococcal Vaccine: Pediatrics (0 to 5 Years) and At-Risk Patients (6 to 64 Years)  Aged Out     HEPATITIS B VACCINES  Aged Out        Subjective:   Chief Complaint: Alan Farris is an 70 y.o. male here for an Annual Wellness visit.   HPI: This is a pleasant 70-year-old male who presents to the clinic for annual wellness visit.  He was last seen in clinic in early 2019.    Medical history is notable for hypertension, hyperlipidemia, obstructive sleep apnea, and prediabetes.  He continues to take lisinopril as well as atorvastatin.  He has tolerated the medications generally well.    He did do the Cologuard test which was normal in January of 2019.    He rates his health as good.  He exercises 3-5 times per week.  He likes to golf regularly during the warmer months.  He has not required any assistance with activities of daily living.    Review of systems is notable for son has waking up at night with his right hand being asleep.  This does not tend to occur during the day.  He denies neck pain.  Denies focal weakness, slurred speech, or other worrisome concerns.  As noted, this appears mostly at night.    He otherwise feels well.       Medical history is otherwise notable for obstructive sleep apnea for which he uses CPAP.    Review of Systems:    Please see above.  The rest of the review of systems are negative for all systems.    Patient Care Team:  Fernando Smith MD as PCP - General  Fernando Smith MD as Assigned PCP     Patient Active Problem List   Diagnosis     Obstructive Sleep Apnea     Morbid obesity (H)     Other hyperlipidemia     Male Erectile Disorder     Benign prostatic hyperplasia with urinary hesitancy     Osteoarthritis Of The  Knee     Prediabetes     Total knee replacement status     Essential hypertension     Past Medical History:   Diagnosis Date     Anemia      BPH (benign prostatic hyperplasia)      Degenerative joint disease      Hypercholesteremia      Hypertension      Knee pain      Obesity     Created by Conversion      Prediabetes      Sleep apnea       Past Surgical History:   Procedure Laterality Date     cataract surgery Bilateral      EYE SURGERY       JOINT REPLACEMENT Right      MS REMOVAL OF TONSILS,<15 Y/O      Description: Tonsillectomy;  Recorded: 09/15/2011;     MS TOTAL KNEE ARTHROPLASTY Right 10/23/2014    Procedure: KNEE TOTAL ARTHROPLASTY;  Surgeon: Kash Bhatt MD;  Location: [facility];  Service: Orthopedics     MS TOTAL KNEE ARTHROPLASTY Left 10/13/2016    Procedure: LEFT TOTAL KNEE  ARTHROPLASTY;  Surgeon: Kash Bhatt MD;  Location: [facility];  Service: Orthopedics     TONSILLECTOMY        Family History   Problem Relation Age of Onset     Heart disease Father      Heart disease Brother       Social History     Socioeconomic History     Marital status:      Spouse name: Not on file     Number of children: Not on file     Years of education: Not on file     Highest education level: Not on file   Occupational History     Not on file   Social Needs     Financial resource strain: Not on file     Food insecurity     Worry: Not on file     Inability: Not on file     Transportation needs     Medical: Not on file     Non-medical: Not on file   Tobacco Use     Smoking status: Never Smoker     Smokeless tobacco: Never Used   Substance and Sexual Activity     Alcohol use: Yes     Alcohol/week: 2.0 standard drinks     Types: 2 Shots of liquor per week     Drug use: No     Sexual activity: Not on file   Lifestyle     Physical activity     Days per week: Not on file     Minutes per session: Not on file     Stress: Not on file   Relationships     Social connections     Talks on phone: Not on  "file     Gets together: Not on file     Attends [employer] service: Not on file     Active member of club or organization: Not on file     Attends meetings of clubs or organizations: Not on file     Relationship status: Not on file     Intimate partner violence     Fear of current or ex partner: Not on file     Emotionally abused: Not on file     Physically abused: Not on file     Forced sexual activity: Not on file   Other Topics Concern     Not on file   Social History Narrative     Not on file      Current Outpatient Medications   Medication Sig Dispense Refill     atorvastatin (LIPITOR) 40 MG tablet Take 1 tablet (40 mg total) by mouth daily. 90 tablet 1     lisinopriL (PRINIVIL,ZESTRIL) 40 MG tablet TAKE 1 TABLET(40 MG) BY MOUTH DAILY 90 tablet 0     No current facility-administered medications for this visit.       Objective:   Vital Signs:   Visit Vitals  /73 (Patient Site: Left Arm, Patient Position: Sitting, Cuff Size: Adult Large)   Pulse 83   Ht 6' 1.5\" (1.867 m)   Wt (!) 305 lb 3.2 oz (138.4 kg)   SpO2 95%   BMI 39.72 kg/m           VisionScreening:  No exam data present     PHYSICAL EXAM    General appearance: alert, appears stated age and cooperative  Head: Normocephalic, without obvious abnormality, atraumatic  Eyes: conjunctivae/corneas clear. PERRL, EOM's intact.   Ears: normal TM's and external ear canals both ears  Nose: Nares normal. Septum midline. Mucosa normal. No drainage or sinus tenderness.  Throat: lips, mucosa, and tongue normal; teeth and gums normal  Neck: no adenopathy, supple, symmetrical, trachea midline  There are no carotid bruits  Back: symmetric, no curvature. ROM normal  Lungs: clear to auscultation bilaterally  Heart: regular rate and rhythm, S1, S2 normal, no murmur, click, rub or gallop  Abdomen: soft, non-tender; bowel sounds normal; no masses,  no organomegaly  Extremities: extremities normal, atraumatic, no cyanosis or edema  Skin: Skin color, texture, turgor normal. " No rashes or lesions  Lymph nodes: Cervical nodes normal.  Neurologic: Alert and oriented X 3      Assessment Results 11/18/2020   Activities of Daily Living No help needed   Instrumental Activities of Daily Living No help needed   Mini Cog Total Score 5   Some recent data might be hidden     A Mini-Cog score of 0-2 suggests the possibility of dementia, score of 3-5 suggests no dementia        Identified Health Risks:     The patient was provided with written information regarding signs of hearing loss.  Patient's advanced directive was discussed and I am comfortable with the patient's wishes.

## 2021-06-14 NOTE — PROGRESS NOTES
"  Chief Complaint   Patient presents with     Finger Injury     R thumb infected         HPI:   Alan Farris is a 67 y.o. male c/o of infection of right thumb.  Sore, red swollen for the last 1-2 days. Thought maybe he had a splinter in it.  Wife poked hole in it but nothing came out.  No fever.  Of note he has had knee replaced    ROS:  A 10 point comprehensive review of systems was negative except as noted.     Medications:  Current Outpatient Prescriptions on File Prior to Visit   Medication Sig Dispense Refill     lisinopril (PRINIVIL,ZESTRIL) 40 MG tablet Take 1 tablet (40 mg total) by mouth daily. 90 tablet 3     [DISCONTINUED] lisinopril (PRINIVIL,ZESTRIL) 40 MG tablet TAKE 1 TABLET BY MOUTH EVERY DAY 90 tablet 2     No current facility-administered medications on file prior to visit.          Social History:  Social History   Substance Use Topics     Smoking status: Never Smoker     Smokeless tobacco: Not on file     Alcohol use 1.2 oz/week     2 Shots of liquor per week         Physical Exam:   Vitals:    11/22/17 1356   BP: 136/60   Pulse: 96   Resp: 20   Temp: 97.9  F (36.6  C)   TempSrc: Oral   Weight: (!) 316 lb 11.2 oz (143.7 kg)   Height: 6' 2\" (1.88 m)       GEN:  NAD  Right thumb:  Erythema along medial nail fold with pus evident.    PROCEDURE:  Procedure explained with risks and benefits.  Oral consent obtained.  Area prepped with iodine   Stab incision with # 11 blade made with return of small amount of pus.  Dressing applied.      Assessment/Plan:    1. Paronychia of finger of right hand  cephalexin 500 mg tablet      Soak in warm soapy water 2-3 times daily for 2-3 days.  Dress as needed.  Cephalexin for three days due to knee replacement.  Recheck for problems.      The following portions of the patient's history were reviewed and updated as appropriate: allergies, current medications, past family history, past medical history, past social history, past surgical history and problem " list.    Victor Manuel Wynne MD      11/22/2017

## 2021-06-14 NOTE — TELEPHONE ENCOUNTER
Refill Approved    Rx renewed per Medication Renewal Policy. Medication was last renewed on 9/29/20, last OV 11/18/20.    Rhonda Lantigua, [facility] Triage/Med Refill 1/1/2021     Requested Prescriptions   Pending Prescriptions Disp Refills     lisinopriL (PRINIVIL,ZESTRIL) 40 MG tablet [Pharmacy Med Name: LISINOPRIL 40MG TABLETS] 90 tablet 0     Sig: TAKE 1 TABLET(40 MG) BY MOUTH DAILY       Ace Inhibitors Refill Protocol Passed - 12/30/2020  9:38 AM        Passed - PCP or prescribing provider visit in past 12 months       Last office visit with prescriber/PCP: 4/17/2017 Fernando Smith MD OR same dept: Visit date not found OR same specialty: 11/22/2017 Victor Manuel Wynne MD  Last physical: 11/18/2020 Last MTM visit: Visit date not found   Next visit within 3 mo: Visit date not found  Next physical within 3 mo: Visit date not found  Prescriber OR PCP: Fernando Smith MD  Last diagnosis associated with med order: 1. HTN (hypertension)  - lisinopriL (PRINIVIL,ZESTRIL) 40 MG tablet [Pharmacy Med Name: LISINOPRIL 40MG TABLETS]; TAKE 1 TABLET(40 MG) BY MOUTH DAILY  Dispense: 90 tablet; Refill: 0    If protocol passes may refill for 12 months if within 3 months of last provider visit (or a total of 15 months).             Passed - Serum Potassium in past 12 months     Lab Results   Component Value Date    Potassium 4.5 11/18/2020             Passed - Blood pressure filed in past 12 months     BP Readings from Last 1 Encounters:   11/18/20 135/73             Passed - Serum Creatinine in past 12 months     Creatinine   Date Value Ref Range Status   11/18/2020 0.88 0.70 - 1.30 mg/dL Final

## 2021-06-16 NOTE — TELEPHONE ENCOUNTER
Refill Approved    Rx renewed per Medication Renewal Policy. Medication was last renewed on 3/23/20.    Flip Tyson, [facility] Triage/Med Refill 3/19/2021     Requested Prescriptions   Pending Prescriptions Disp Refills     atorvastatin (LIPITOR) 40 MG tablet [Pharmacy Med Name: ATORVASTATIN 40MG TABLETS] 90 tablet 1     Sig: TAKE 1 TABLET(40 MG) BY MOUTH DAILY       Statins Refill Protocol (Hmg CoA Reductase Inhibitors) Passed - 3/18/2021  8:50 AM        Passed - PCP or prescribing provider visit in past 12 months      Last office visit with prescriber/PCP: 4/17/2017 Fernando Smith MD OR same dept: Visit date not found OR same specialty: 11/22/2017 Victor Manuel Wynne MD  Last physical: 11/18/2020 Last MTM visit: Visit date not found   Next visit within 3 mo: Visit date not found  Next physical within 3 mo: Visit date not found  Prescriber OR PCP: Fernando Smith MD  Last diagnosis associated with med order: 1. Other hyperlipidemia  - atorvastatin (LIPITOR) 40 MG tablet [Pharmacy Med Name: ATORVASTATIN 40MG TABLETS]; TAKE 1 TABLET(40 MG) BY MOUTH DAILY  Dispense: 90 tablet; Refill: 1    If protocol passes may refill for 12 months if within 3 months of last provider visit (or a total of 15 months).

## 2021-06-17 NOTE — PATIENT INSTRUCTIONS - HE
Patient Instructions by Fernando Smith MD at 1/4/2019  9:00 AM     Author: Fernando Smith MD Service: -- Author Type: Physician    Filed: 1/4/2019  9:46 AM Encounter Date: 1/4/2019 Status: Addendum    : Fernando Smith MD (Physician)    Related Notes: Original Note by Fernando Smith MD (Physician) filed at 1/4/2019  9:39 AM       It was good to see you  You received the pneumonia vaccine  Consider the Shingrix/shingles vaccine.  You can check with your insurance regarding coverage  You will receive information regarding the Cologuard test  Increase physical activity.  The goal is 150 minutes/week  We will check your laboratory testing as noted.  It is possible you will need a cholesterol-lowering medication to reduce your cardiovascular risk      Patient Education     Exercise for a Healthier Heart  You may wonder how you can improve the health of your heart. If youre thinking about exercise, youre on the right track. You dont need to become an athlete, but you do need a certain amount of brisk exercise to help strengthen your heart. If you have been diagnosed with a heart condition, your doctor may recommend exercise to help stabilize your condition. To help make exercise a habit, choose safe, fun activities.       Be sure to check with your health care provider before starting an exercise program.    Why exercise?  Exercising regularly offers many healthy rewards. It can help you do all of the following:    Improve your blood cholesterol levels to help prevent further heart trouble    Lower your blood pressure to help prevent a stroke or heart attack    Control diabetes, or reduce your risk of getting this disease    Improve your heart and lung function    Reach and maintain a healthy weight    Make your muscles stronger and more limber so you can stay active    Prevent falls and fractures by slowing the loss of bone mass (osteoporosis)    Manage stress  better  Exercise tips  Ease into your routine. Set small goals. Then build on them.  Exercise on most days. Aim for a total of 150 or more minutes of moderate to  vigorous intensity activity each week. Consider 40 minutes, 3 to 4 times a week. For best results, activity should last for 40 minutes on average. It is OK to work up to the 40 minute period over time. Examples of moderate-intensity activity is walking one mile in 15 minutes or 30 to 45 minutes of yard work.  Step up your daily activity level. Along with your exercise program, try being more active throughout the day. Walk instead of drive. Do more household tasks or yard work.  Choose one or more activities you enjoy. Walking is one of the easiest things you can do. You can also try swimming, riding a bike, or taking an exercise class.  Stop exercising and call your doctor if you:    Have chest pain or feel dizzy or lightheaded    Feel burning, tightness, pressure, or heaviness in your chest, neck, shoulders, back, or arms    Have unusual shortness of breath    Have increased joint or muscle pain    Have palpitations or an irregular heartbeat      2295-9987 [employer]. [address] [place] [address]. All rights reserved. This information is not intended as a substitute for professional medical care. Always follow your healthcare professional's instructions.           Advance Directive  Patients advance directive was discussed and I am comfortable with the patients wishes.  Patient Education   Personalized Prevention Plan  You are due for the preventive services outlined below.  Your care team is available to assist you in scheduling these services.  If you have already completed any of these items, please share that information with your care team to update in your medical record.  Health Maintenance   Topic Date Due   ? ADVANCE DIRECTIVES DISCUSSED WITH PATIENT  06/15/1970   ? ZOSTER VACCINES (2 of 3) 12/12/2013   ? COLONOSCOPY   08/25/2014   ? PNEUMOCOCCAL POLYSACCHARIDE VACCINE AGE 65 AND OVER  06/15/2017   ? FALL RISK ASSESSMENT  08/14/2018   ? TD 18+ HE  09/15/2021   ? INFLUENZA VACCINE RULE BASED  Completed   ? PNEUMOCOCCAL CONJUGATE VACCINE FOR ADULTS (PCV13 OR PREVNAR)  Completed

## 2021-06-18 NOTE — PATIENT INSTRUCTIONS - HE
Patient Instructions by Fernando Smith MD at 11/18/2020  1:20 PM     Author: Fernando Smith MD Service: -- Author Type: Physician    Filed: 11/18/2020  2:22 PM Encounter Date: 11/18/2020 Status: Signed    : Fernando Smith MD (Physician)         Patient Education   Signs of Hearing Loss  Hearing loss is a problem shared by many people. In fact, it is one of the most common health conditions, particularly as people age. Most people over age 65 have some hearing loss, and by age 80, almost everyone does. Because hearing loss usually occurs slowly over the years, you may not realize your hearing ability has gotten worse.       Have your hearing checked  Contact your [facility] care provider if you:    Have to strain to hear normal conversation.    Have to watch other peoples faces very carefully to follow what theyre saying.    Need to ask people to repeat what theyve said.    Often misunderstand what people are saying.    Turn the volume of the television or radio up so high that others complain.    Feel that people are mumbling when theyre talking to you.    Find that the effort to hear leaves you feeling tired and irritated.    Notice, when using the phone, that you hear better with 1 ear than the other.    [id number] [employer]. [address] [place] [address], [place] [address]. All rights reserved. This information is not intended as a substitute for professional medical care. Always follow your healthcare professional's instructions.           Advance Directive  Patients advance directive was discussed and I am comfortable with the patients wishes.  Patient Education   Personalized Prevention Plan  You are due for the preventive services outlined below.  Your care team is available to assist you in scheduling these services.  If you have already completed any of these items, please share that information with your care team to update in your medical record.  [facility]  Maintenance   Topic Date Due   ? HEPATITIS C SCREENING  1950   ? ZOSTER VACCINES (2 of 3) 12/12/2013   ? TD 18+ HE  09/15/2021   ? MEDICARE ANNUAL WELLNESS VISIT  11/18/2021   ? FALL RISK ASSESSMENT  11/18/2021   ? COLORECTAL CANCER SCREENING  01/31/2022   ? LIPID  07/01/2024   ? ADVANCE CARE PLANNING  11/18/2025   ? Pneumococcal Vaccine: 65+ Years  Completed   ? INFLUENZA VACCINE RULE BASED  Completed   ? Pneumococcal Vaccine: Pediatrics (0 to 5 Years) and At-Risk Patients (6 to 64 Years)  Aged Out   ? HEPATITIS B VACCINES  Aged Out

## 2021-06-21 ENCOUNTER — COMMUNICATION - HEALTHEAST (OUTPATIENT)
Dept: FAMILY MEDICINE | Facility: CLINIC | Age: 71
End: 2021-06-21

## 2021-06-21 DIAGNOSIS — I10 HTN (HYPERTENSION): ICD-10-CM

## 2021-06-21 DIAGNOSIS — E78.49 OTHER HYPERLIPIDEMIA: ICD-10-CM

## 2021-06-22 RX ORDER — LISINOPRIL 40 MG/1
40 TABLET ORAL DAILY
Qty: 90 TABLET | Refills: 1 | Status: SHIPPED | OUTPATIENT
Start: 2021-06-22

## 2021-06-22 NOTE — PROGRESS NOTES
Assessment/ Plan     1. Medicare annual wellness visit, subsequent    Reviewed the annual wellness visit form  Mini cog score is 4/5  Reviewed falls risk is high PHQ-2 score is 0    Patient is willing to do the Cologuard test  - Ambulatory referral for Colonoscopy    Reviewed the caveats of prostate cancer screening  - PSA (Prostatic-Specific Antigen), Annual Screen    Pneumovax vaccine was given excellent recommend he consider the Shingrix vaccine    2. HTN (hypertension)    Continue lisinopril  Continue to monitor blood pressure  Recommend increasing physical activity and work on weight loss  - lisinopril (PRINIVIL,ZESTRIL) 40 MG tablet; Take 1 tablet (40 mg total) by mouth daily.  Dispense: 90 tablet; Refill: 3  - Basic Metabolic Panel  - HM2(CBC w/o Differential)    3. Other hyperlipidemia    Check a cholesterol level and calculate his 10-year cardiovascular risk  - Hepatic Profile  - Lipid Cascade    4. Prediabetes    Continue liver carbohydrates  Check a glucose level  Consider heme globin A1c or possible to our glucose challenge test    5. Obstructive Sleep Apnea    Continue CPAP    6. Urinary frequency  May be secondary to BPH with urinary obstructive symptoms    Check a urinalysis  Reviewed options including referring to urology to do a post void residual check  - Urinalysis-UC if Indicated    7. Encounter for screening for malignant neoplasm of prostate     Check a PSA  - PSA (Prostatic-Specific Antigen), Annual Screen    8. Morbid obesity (H)    Recommend working to improve diet and exercise  Recommend weight loss      Subjective:       Alan Farris is a 68 y.o. male who presents for a complete physical examination.  His medical history is notable for hypertension, hyperlipidemia, obstructive sleep apnea, and prediabetes.    Regarding hypertension he continues to take lisinopril and his blood pressure has generally been well controlled.  He also has a history of hyperlipidemia and his last total  "cholesterol was 256 with an LDL of 179.  He is not currently taking a cholesterol-lowering medication.  His glucose value was 112 at the last visit.    He rates his health as good.  He exercises 0-2 times per week but hopes to do more golfing over the winter months when he travels.  He does not require any assistance with activities of daily living.    Medical history is otherwise notable for obstructive sleep apnea for which he uses CPAP.    Review of systems is notable for urinary frequency.  He notes that he seems to be urinating more.  His stream is mildly reduced.  He does not dribble.  He denies significant nighttime urinary symptoms of concern.  Denies burning with urination or blood in his urine.    His last colonoscopy was in 2004.  He is willing to do the Cologuard test this year.    Previous surgeries include bilateral total knee arthroplasty.  He has had cataract repair as well.    The following portions of the patient's history were reviewed and updated as appropriate: allergies, current medications, past family history, past medical history, past social history, past surgical history and problem list. Medications have been reconciled.    Review of Systems   A 12 point comprehensive review of systems was negative except as noted.      Current Outpatient Medications   Medication Sig Dispense Refill     lisinopril (PRINIVIL,ZESTRIL) 40 MG tablet Take 1 tablet (40 mg total) by mouth daily. 90 tablet 3     No current facility-administered medications for this visit.        Objective:      /72   Pulse 76   Ht 6' 1.5\" (1.867 m)   Wt (!) 315 lb 9.6 oz (143.2 kg)   BMI 41.07 kg/m        General appearance: alert, appears stated age and cooperative  Head: Normocephalic, without obvious abnormality, atraumatic  Eyes: conjunctivae/corneas clear. PERRL, EOM's intact.   Ears: normal TM's and external ear canals both ears  Nose: Nares normal. Septum midline. Mucosa normal. No drainage or sinus " tenderness.  Throat: lips, mucosa, and tongue normal; teeth and gums normal  Neck: no adenopathy, supple, symmetrical, trachea midline and thyroid not enlarged  There are no carotid bruits  Back: symmetric, no curvature. ROM normal. No CVA tenderness.  Lungs: clear to auscultation bilaterally  Heart: regular rate and rhythm, S1, S2 normal, no murmur, click, rub or gallop  Abdomen: soft, non-tender; bowel sounds normal; no masses,  no organomegaly  Abdominal obesity is noted  Genitourinary: Penis is circumcised, no scrotal masses, no inguinal hernia  Rectal: Normal sphincter tone, prostate smooth and symmetric and moderately enlarged  Extremities: extremities normal, atraumatic, no cyanosis or edema  Skin: Skin color, texture, turgor normal. No rashes or lesions  Lymph nodes: Cervical nodes normal.  Neurologic: Alert and oriented X 3         No results found for this or any previous visit (from the past 168 hour(s)).       This note has been dictated using voice recognition software. Any grammatical or context distortions are unintentional and inherent to the software  Assessment and Plan:

## 2021-06-24 ENCOUNTER — COMMUNICATION - HEALTHEAST (OUTPATIENT)
Dept: FAMILY MEDICINE | Facility: CLINIC | Age: 71
End: 2021-06-24

## 2021-06-24 DIAGNOSIS — E78.49 OTHER HYPERLIPIDEMIA: ICD-10-CM

## 2021-06-24 RX ORDER — ATORVASTATIN CALCIUM 40 MG/1
40 TABLET, FILM COATED ORAL DAILY
Qty: 90 TABLET | Refills: 2 | Status: SHIPPED | OUTPATIENT
Start: 2021-06-24

## 2021-06-24 NOTE — TELEPHONE ENCOUNTER
Reason contacted:  panchito results  Information relayed:  MD message below  Additional questions:  No  Further follow-up needed:  No  Okay to leave a detailed message:  DANIEL

## 2021-06-24 NOTE — TELEPHONE ENCOUNTER
----- Message from Fernando Smith MD sent at 2/13/2019  5:38 PM CST -----  Please call. His Cologuard test (screening for colon cancer) was negative.

## 2021-06-24 NOTE — TELEPHONE ENCOUNTER
----- Message from Fernando Smith MD sent at 2/14/2019  7:10 PM CST -----  Please call:    Laboratory testing shows that the urine test is normal.  The prostate test is normal.    The kidney profile is normal though the glucose is mildly elevated.  Limiting carbohydrates is important.    The liver tests are normal.    The cholesterol numbers are significantly elevated.  A calculation of the 10-year cardiovascular risk is elevated indicating that he would benefit from a cholesterol-lowering medication.  I do recommend that he continue work on diet and exercise and favor a cholesterol medicine such as atorvastatin.  Please let me know if he agrees or if he has concerns.

## 2021-06-24 NOTE — TELEPHONE ENCOUNTER
Dr. Calixarity-  Spoke to pt, relayed your lab result message.  Pt states [place] is [place] until [place].  He will call when he returns to have a prescription sent in at that time if he wishes to start a statin.

## 2021-06-26 NOTE — TELEPHONE ENCOUNTER
Denied Rx, patient should have refills on file.    atorvastatin (LIPITOR) 40 MG tablet 90 tablet 2 3/19/2021  No   Sig: TAKE 1 TABLET(40 MG) BY MOUTH DAILY   Sent to pharmacy as: atorvastatin 40 mg tablet (LIPITOR)   E-Prescribing Status: Receipt confirmed by pharmacy (3/19/2021 11:52 AM CDT)         Ave Fishman RN, BSN Nurse Triage Advisor 3:01 PM 6/24/2021

## 2021-06-26 NOTE — TELEPHONE ENCOUNTER
Refill Approved    Rx renewed per Medication Renewal Policy. Medication was last renewed on 11.1820.    Donna Liu, [facility] Connection Triage/Med Refill 6/22/2021     Requested Prescriptions   Pending Prescriptions Disp Refills     lisinopriL (PRINIVIL,ZESTRIL) 40 MG tablet 90 tablet 2     Sig: Take 1 tablet (40 mg total) by mouth daily.       [facility] Inhibitors Refill Protocol Passed - 6/21/2021  9:59 AM        Passed - PCP or prescribing provider visit in past 12 months       Last office visit with prescriber/PCP: Visit date not found OR same dept: Visit date not found OR same specialty: 11/22/2017 Victor Manuel Wynne MD  Last physical: 11/18/2020 Last MTM visit: Visit date not found   Next visit within 3 mo: Visit date not found  Next physical within 3 mo: Visit date not found  Prescriber OR PCP: Fernando Smith MD  Last diagnosis associated with med order: 1. HTN (hypertension)  - lisinopriL (PRINIVIL,ZESTRIL) 40 MG tablet; Take 1 tablet (40 mg total) by mouth daily.  Dispense: 90 tablet; Refill: 2    If protocol passes may refill for 12 months if within 3 months of last provider visit (or a total of 15 months).             Passed - Serum Potassium in past 12 months     Lab Results   Component Value Date    Potassium 4.5 11/18/2020             Passed - Blood pressure filed in past 12 months     BP Readings from Last 1 Encounters:   11/18/20 135/73             Passed - Serum Creatinine in past 12 months     Creatinine   Date Value Ref Range Status   11/18/2020 0.88 0.70 - 1.30 mg/dL Final

## 2021-06-26 NOTE — TELEPHONE ENCOUNTER
Denied Rx, patient given 9 month supply to same pharmacy in March of 2021. Should have refills on file.    Ave Fishman RN, BSN Nurse Triage Advisor 11:12 AM 6/21/2021

## 2021-06-26 NOTE — TELEPHONE ENCOUNTER
Incoming call from pt requesting refill. Looks like it was denied by the refill nurse. I spoke to [facility] and they do not have refills on file. Please send new Rx

## 2021-06-26 NOTE — TELEPHONE ENCOUNTER
Requested Prescriptions     Pending Prescriptions Disp Refills     lisinopriL (PRINIVIL,ZESTRIL) 40 MG tablet 90 tablet 2     Sig: Take 1 tablet (40 mg total) by mouth daily.

## 2021-06-27 ENCOUNTER — HEALTH MAINTENANCE LETTER (OUTPATIENT)
Age: 71
End: 2021-06-27

## 2021-10-07 ENCOUNTER — MEDICAL CORRESPONDENCE (OUTPATIENT)
Dept: HEALTH INFORMATION MANAGEMENT | Facility: CLINIC | Age: 71
End: 2021-10-07

## 2021-10-17 ENCOUNTER — HEALTH MAINTENANCE LETTER (OUTPATIENT)
Age: 71
End: 2021-10-17

## 2021-12-27 DIAGNOSIS — I10 HTN (HYPERTENSION): ICD-10-CM

## 2021-12-29 RX ORDER — LISINOPRIL 40 MG/1
40 TABLET ORAL DAILY
Qty: 90 TABLET | Refills: 2 | OUTPATIENT
Start: 2021-12-29

## 2021-12-29 NOTE — TELEPHONE ENCOUNTER
Please notify the pharmacy that patient has moved away from [place] and was last seen over a year ago.  He should have established with another provider.  I can send a limited refill of his running out of medication but he should have established care by now.

## 2021-12-30 NOTE — TELEPHONE ENCOUNTER
I checked with patient since this request came from [facility] in [place] and pt said this should be going through his new doctor. Pt will call [facility] to make sure they have his correct Dr on file

## 2022-02-06 ENCOUNTER — HEALTH MAINTENANCE LETTER (OUTPATIENT)
Age: 72
End: 2022-02-06

## 2022-10-02 ENCOUNTER — HEALTH MAINTENANCE LETTER (OUTPATIENT)
Age: 72
End: 2022-10-02

## 2023-02-11 ENCOUNTER — HEALTH MAINTENANCE LETTER (OUTPATIENT)
Age: 73
End: 2023-02-11

## 2024-03-09 ENCOUNTER — HEALTH MAINTENANCE LETTER (OUTPATIENT)
Age: 74
End: 2024-03-09